# Patient Record
Sex: FEMALE
[De-identification: names, ages, dates, MRNs, and addresses within clinical notes are randomized per-mention and may not be internally consistent; named-entity substitution may affect disease eponyms.]

---

## 2022-11-28 PROBLEM — Z00.00 ENCOUNTER FOR PREVENTIVE HEALTH EXAMINATION: Status: ACTIVE | Noted: 2022-11-28

## 2022-11-29 ENCOUNTER — APPOINTMENT (OUTPATIENT)
Dept: OTOLARYNGOLOGY | Facility: CLINIC | Age: 47
End: 2022-11-29

## 2022-11-29 VITALS — TEMPERATURE: 97.3 F | BODY MASS INDEX: 23.32 KG/M2 | WEIGHT: 140 LBS | HEIGHT: 65 IN

## 2022-11-29 DIAGNOSIS — Z78.9 OTHER SPECIFIED HEALTH STATUS: ICD-10-CM

## 2022-11-29 DIAGNOSIS — Z82.5 FAMILY HISTORY OF ASTHMA AND OTHER CHRONIC LOWER RESPIRATORY DISEASES: ICD-10-CM

## 2022-11-29 DIAGNOSIS — Z80.9 FAMILY HISTORY OF MALIGNANT NEOPLASM, UNSPECIFIED: ICD-10-CM

## 2022-11-29 DIAGNOSIS — F32.A DEPRESSION, UNSPECIFIED: ICD-10-CM

## 2022-11-29 PROCEDURE — 31575 DIAGNOSTIC LARYNGOSCOPY: CPT

## 2022-11-29 PROCEDURE — 99203 OFFICE O/P NEW LOW 30 MIN: CPT | Mod: 25

## 2022-11-29 RX ORDER — ALPRAZOLAM 2 MG/1
TABLET ORAL
Refills: 0 | Status: ACTIVE | COMMUNITY

## 2022-11-29 RX ORDER — ZOLPIDEM TARTRATE 5 MG/1
TABLET, FILM COATED ORAL
Refills: 0 | Status: ACTIVE | COMMUNITY

## 2022-11-29 NOTE — CONSULT LETTER
[Dear  ___] : Dear  [unfilled], [Consult Letter:] : I had the pleasure of evaluating your patient, [unfilled]. [Please see my note below.] : Please see my note below. [Consult Closing:] : Thank you very much for allowing me to participate in the care of this patient.  If you have any questions, please do not hesitate to contact me. [Sincerely,] : Sincerely, [FreeTextEntry3] : Jenny Wilkerson MD\par

## 2022-11-29 NOTE — ASSESSMENT
[FreeTextEntry1] : She had a sore throat which has since improved.  This may have been due to an upper respiratory tract infection.  Her tonsils look normal on exam.  She does have a history of reflux and may have also had a reflux exacerbation.  Flexible laryngoscopy showed very mild reflux related laryngeal changes.  There were no lesions seen\par \par She has a history of chronic rhinitis.  She occasionally has nosebleeds.  She had a small spot of blood on the left inferior turbinate.\par \par She also feels that she has tongue swelling intermittently.  Her tongue looks normal on exam today\par \par Plan\par -Findings and management options were discussed with the patient.\par -Hydration and medication recommended\par -Salt water gargles as needed.  She was asked to avoid alcohol based mouthwashes\par -Reflux precautions and elevation of the head of bed at night.  She was given literature\par -She may take OTC Pepcid\par -I recommended GI evaluation to rule out esophageal changes\par -Moisturizing nasal gel as needed.  If she continues to have nosebleeds, she may benefit from cauterization\par -I am giving her Atrovent nasal spray to try for the nasal drainage\par -I would like to see her if she has tongue swelling.  I suggested allergy evaluation to rule out allergy as a source.  She may also need evaluation to rule out idiopathic angioedema\par -I would like to see her back if she has recurrent or worsening symptoms.  If she is doing well, I suggested follow-up in 1 year

## 2022-11-29 NOTE — HISTORY OF PRESENT ILLNESS
[de-identified] : AJAY BYRD is a 47 year old patient here for a 4-week history of a sore throat.  It has improved.  She no longer has throat pain.  She denies dysphagia but does have mild hoarseness and mucus in the throat.  She also has a chronic runny nose.  She denies nasal congestion obstruction.  She has also occasional nosebleeds.  She has a history of reflux.  She is not currently on medication.  She said that she also feels like sometimes her tongue gets swollen.  She has not had difficulty breathing with it.

## 2024-03-15 ENCOUNTER — APPOINTMENT (OUTPATIENT)
Dept: OTOLARYNGOLOGY | Facility: CLINIC | Age: 49
End: 2024-03-15
Payer: COMMERCIAL

## 2024-03-15 DIAGNOSIS — R49.0 DYSPHONIA: ICD-10-CM

## 2024-03-15 DIAGNOSIS — Z87.09 PERSONAL HISTORY OF OTHER DISEASES OF THE RESPIRATORY SYSTEM: ICD-10-CM

## 2024-03-15 DIAGNOSIS — J31.0 CHRONIC RHINITIS: ICD-10-CM

## 2024-03-15 DIAGNOSIS — K21.9 GASTRO-ESOPHAGEAL REFLUX DISEASE W/OUT ESOPHAGITIS: ICD-10-CM

## 2024-03-15 PROCEDURE — 99213 OFFICE O/P EST LOW 20 MIN: CPT | Mod: 25

## 2024-03-15 PROCEDURE — 31575 DIAGNOSTIC LARYNGOSCOPY: CPT

## 2024-03-15 RX ORDER — CARBAMAZEPINE 200 MG/1
200 CAPSULE, EXTENDED RELEASE ORAL
Refills: 0 | Status: ACTIVE | COMMUNITY

## 2024-03-15 RX ORDER — FAMOTIDINE 20 MG/1
20 TABLET, FILM COATED ORAL
Qty: 60 | Refills: 3 | Status: ACTIVE | COMMUNITY
Start: 2024-03-15 | End: 1900-01-01

## 2024-03-15 RX ORDER — IPRATROPIUM BROMIDE 21 UG/1
0.03 SPRAY NASAL
Qty: 1 | Refills: 3 | Status: DISCONTINUED | COMMUNITY
Start: 2022-11-29 | End: 2024-03-15

## 2024-03-15 RX ORDER — IPRATROPIUM BROMIDE 21 UG/1
0.03 SPRAY NASAL
Qty: 1 | Refills: 3 | Status: ACTIVE | COMMUNITY
Start: 2024-03-15 | End: 1900-01-01

## 2024-03-15 RX ORDER — CARIPRAZINE 6 MG/1
CAPSULE, GELATIN COATED ORAL
Refills: 0 | Status: DISCONTINUED | COMMUNITY
End: 2024-03-15

## 2024-03-15 NOTE — ASSESSMENT
[FreeTextEntry1] : She has a history of hoarseness.  This may be due to reflux and chronic postnasal drip.  On exam, there were no vocal cord lesions.  She did have laryngeal changes consistent with reflux and mild supraglottic hyperfunctioning.  She has a history of chronic rhinitis and epistaxis.  On exam she has a deviated septum  Plan -Findings and management options were discussed with the patient. - Hydration and humidification - Moisturizing nasal gel as needed - Nasal saline rinses as needed.  I am giving her Atrovent to try again to see if that helps with the nasal drainage - Reflux precautions recommended.  She was given literature - Trial of Pepcid - Good vocal hygiene reviewed - I am sending her for speech pathology evaluation - She should call me if she has a nosebleed.  I can cauterize that area if needed.  She had no active bleeding on exam today. - I will see her back or speak with her after the evaluation - She should call and return earlier if she has any concerns or worsening symptoms

## 2024-03-15 NOTE — PHYSICAL EXAM
[TextEntry] : General: The patient was alert, oriented and in no distress. Voice was essentially clear.  Face: The patient had no facial asymmetry or mass. The skin was unremarkable.  Ears: Hearing normal to conversational voice External ears were normal without deformity. Ear canals were clear. No cerumen or inflammation Tympanic membranes were intact and normal. No perforation or effusion. mobile  Nose: The external nose had no significant deformity.  On anterior rhinoscopy, the nasal mucosa was clear. The anterior septum was grossly midline. There were no visualized polyps, purulence or masses.  Oral cavity: Oral mucosa- normal. Oral and base of tongue- clear and without mass. Gingival and buccal mucosa- moist and without lesions. Palate- the palate moved well. There was no cleft palate. There appeared to be good salivary flow. Oral cavity/oropharynx- no pus, erythema or mass  Neck: The neck was symmetrical. The parotid and submandibular glands were normal without masses. The trachea was midline and there was no unusual crepitus. Thyroid was smooth and nontender and no masses were palpated. No masses  Lymphatics: Cervical adenopathy- none. Procedure     PROCEDURE: FLEXIBLE LARYNGOSCOPY   Surgeon: Dr. Wilkerson Indication: hoarseness, history of reflux, assess for lesions, inadequate/inability tolerate transoral exam Anesthetic: Topical lidocaine and Afrin Procedure: The patient was placed in a sitting position. Following application of the topical anesthetic and decongestant, exam was performed with a flexible telescope. The scope was passed along the right nasal floor to the nasopharynx. It was then passed into the region of the middle meatus, middle turbinate, and sphenoethmoid region.  The following findings were noted:  Nasal mucosa: Mild edema Septum: mildly deviated  Right nasal cavity  Inferior turbinate: Normal  Middle turbinate: normal  Superior turbinate: normal  Inferior meatus: no pus, polyps or congestion  Middle meatus: no pus, polyps or congestion  Superior meatus: no pus, polyps, or congestion  Sphenoethmoidal recess: no pus, polyps or congestion  Nasopharynx: no masses, choanae patent, no adenoid tissue  Base of tongue and vallecula: no masses or asymmetry Posterior pharyngeal wall: no masses. Hypopharynx: symmetrical. No masses Pyriform sinuses: no lesions or pooling of secretions Epiglottis: normal. No edema or lesions Aryepiglottic folds: normal. No lesions. True vocal cords: clear and mobile. No lesions. Airway patent. very mild vocal cord edema. mild supraglottic hyperfunctioning.  False vocal cords: normal Ventricles: no masses. Arytenoids: Normal Interarytenoid area: no masses. Mild edema Subglottis: normal. no masses

## 2024-03-15 NOTE — HISTORY OF PRESENT ILLNESS
[de-identified] : AJAY BYRD is a 48 year old patient With a history of chronic rhinitis and reflux.  She is here for hoarseness.  She finds that it has been bothering her more.  She said that it sometimes hard to push out air when she is speaking for a long period of time.  She has no throat pain or dysphagia.  She does suffer from a postnasal drip.  She had been on Pepcid in the past.  She uses her voice moderately throughout the day.  She does not sing or yell.  She has a history of reflux.  She has not seen a gastroenterologist She has a history of a chronic postnasal drip She has history of recurrent epistaxis